# Patient Record
Sex: FEMALE | Race: WHITE | NOT HISPANIC OR LATINO | Employment: PART TIME | URBAN - NONMETROPOLITAN AREA
[De-identification: names, ages, dates, MRNs, and addresses within clinical notes are randomized per-mention and may not be internally consistent; named-entity substitution may affect disease eponyms.]

---

## 2021-07-28 ENCOUNTER — OFFICE VISIT (OUTPATIENT)
Dept: OBSTETRICS AND GYNECOLOGY | Facility: CLINIC | Age: 21
End: 2021-07-28

## 2021-07-28 VITALS
WEIGHT: 232.8 LBS | BODY MASS INDEX: 34.48 KG/M2 | HEIGHT: 69 IN | SYSTOLIC BLOOD PRESSURE: 120 MMHG | DIASTOLIC BLOOD PRESSURE: 80 MMHG

## 2021-07-28 DIAGNOSIS — N91.2 AMENORRHEA: Primary | ICD-10-CM

## 2021-07-28 PROCEDURE — 99202 OFFICE O/P NEW SF 15 MIN: CPT | Performed by: PHYSICIAN ASSISTANT

## 2021-07-28 NOTE — PATIENT INSTRUCTIONS
Patient offered option of resuming ocp for cycle regulation but will wait on this. Since she had spontaneous cycle last month wants to observe her cycles for the next 2-3 months. Follow up prn

## 2021-07-28 NOTE — PROGRESS NOTES
"Subjective   Chief Complaint   Patient presents with   • Menstrual Problem     Patient went 4 months without a period.  Had one in        Katie Nguyen is a 21 y.o. year old  presenting to be seen for amenorrhea.  Patient reports that she went 4 months without a period but had a normal period Iris 27 x 4 days.  She is sexually active and monogamous with female partner only  She was diagnosed with polycystic ovary syndrome as a teenager. She took ocp for for about 3 years but discontinued when got to college.  Prior to the 4 months of amenorrhea she had been having cycles about q 30-32 days  No pelvic pain   Past Medical History:   Diagnosis Date   • Migraine    • Ovarian cyst    • Polycystic ovary syndrome       No current outpatient medications on file.   No Known Allergies   History reviewed. No pertinent surgical history.   Social History     Socioeconomic History   • Marital status: Single     Spouse name: Not on file   • Number of children: Not on file   • Years of education: Not on file   • Highest education level: Not on file   Tobacco Use   • Smoking status: Never Smoker   • Smokeless tobacco: Never Used   Vaping Use   • Vaping Use: Never used   Substance and Sexual Activity   • Alcohol use: Yes     Comment: 2 per month   • Drug use: Never   • Sexual activity: Yes     Partners: Female     Birth control/protection: None      Family History   Problem Relation Age of Onset   • No Known Problems Father    • No Known Problems Mother        Review of Systems   Constitutional: Negative for chills, diaphoresis and fever.   Gastrointestinal: Negative.    Genitourinary: Positive for menstrual problem. Negative for dysuria, pelvic pain and vaginal discharge.   All other systems reviewed and are negative.          Objective   /80   Ht 175.3 cm (69\")   Wt 106 kg (232 lb 12.8 oz)   LMP 2021 (Exact Date)   Breastfeeding No   BMI 34.38 kg/m²     Physical Exam  Constitutional:       " Appearance: Normal appearance. She is well-developed and well-groomed. She is obese.   Eyes:      General: Lids are normal.      Extraocular Movements: Extraocular movements intact.      Conjunctiva/sclera: Conjunctivae normal.   Abdominal:      Palpations: Abdomen is soft. There is no hepatomegaly or splenomegaly.      Tenderness: There is no abdominal tenderness. There is no guarding.   Genitourinary:     Labia:         Right: No rash, tenderness or lesion.         Left: No rash, tenderness or lesion.       Urethra: No prolapse, urethral pain, urethral swelling or urethral lesion.      Vagina: No vaginal discharge, tenderness or lesions.      Cervix: No cervical motion tenderness, discharge, friability or lesion.      Uterus: Not enlarged and not tender.       Adnexa:         Right: No mass or tenderness.          Left: No mass or tenderness.     Skin:     General: Skin is warm and dry.      Findings: No bruising or lesion.   Neurological:      General: No focal deficit present.      Mental Status: She is alert and oriented to person, place, and time.   Psychiatric:         Attention and Perception: Attention normal.         Mood and Affect: Mood normal.         Speech: Speech normal.         Behavior: Behavior is cooperative.            Result Review :                   Assessment and Plan  Diagnoses and all orders for this visit:    1. Amenorrhea (Primary)      Patient Instructions   Patient offered option of resuming ocp for cycle regulation but will wait on this. Since she had spontaneous cycle last month wants to observe her cycles for the next 2-3 months. Follow up prn               This note was electronically signed.    Inés Edwards PA-C   July 28, 2021